# Patient Record
Sex: MALE | Race: WHITE | NOT HISPANIC OR LATINO | Employment: UNEMPLOYED | URBAN - METROPOLITAN AREA
[De-identification: names, ages, dates, MRNs, and addresses within clinical notes are randomized per-mention and may not be internally consistent; named-entity substitution may affect disease eponyms.]

---

## 2018-01-12 ENCOUNTER — TELEPHONE (OUTPATIENT)
Dept: HEMATOLOGY/ONCOLOGY | Facility: CLINIC | Age: 48
End: 2018-01-12

## 2018-01-12 NOTE — TELEPHONE ENCOUNTER
Yesterday, faxed request for records to [facility] & [facility].  Today tried calling pt in a.m. & p.m. -- busy signal both times.  Received bone biopsy report from [facility].    Will send letter to patient.  ========================================  ----- Message from Nanette Kellogg sent at 1/11/2018 11:52 AM CST -----    Patient has active [employer] coverage  Will update in epic    ----- Message -----  From: Radha Mon RN  Sent: 1/11/2018  11:22 AM  To: Nanette Fitzpatrick,   Requesting benefit check, please.  thanks    -Radha  ----- Message -----  From: Tamir Jarvis RN  Sent: 1/10/2018   3:01 PM  To: Radha Mon RN    Good afternoon Radha,    This is a referral for an autologous stem cell transplant eval. He has been seen by multiple MD's but the referral is from [facility]. Let me know if you need any assistance.    Thanks,  Tamir ROCHA

## 2018-01-12 NOTE — LETTER
January 12, 2018    Shaun Mckinney  [address] [address] [place]  [place] [address]             [facility]  [address]  [place] [address]  Phone: [phone] Dear Mr. Mckinney:    We received a request to schedule an appointment with our Hematology-Oncology department.    At this time, we are gathering clinical data.  When we have sufficient information we will contact you to set up an appointment.      Please complete the attached Release Form in case other facilities will require it.  If you have any questions or concerns, please don't hesitate to call.    Sincerely,        Radha Mon RN

## 2018-01-19 ENCOUNTER — TELEPHONE (OUTPATIENT)
Dept: HEMATOLOGY/ONCOLOGY | Facility: CLINIC | Age: 48
End: 2018-01-19